# Patient Record
Sex: FEMALE | Race: WHITE | NOT HISPANIC OR LATINO | URBAN - METROPOLITAN AREA
[De-identification: names, ages, dates, MRNs, and addresses within clinical notes are randomized per-mention and may not be internally consistent; named-entity substitution may affect disease eponyms.]

---
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
## 2024-01-03 ENCOUNTER — TELEPHONE (OUTPATIENT)
Dept: OBGYN CLINIC | Facility: CLINIC | Age: 19
End: 2024-01-03

## 2024-01-03 NOTE — TELEPHONE ENCOUNTER
Received a call from referral specialist, Wilbur, stating the patient left a message on her line. Patient stated she is transferring care to a different office.

## 2024-01-03 NOTE — TELEPHONE ENCOUNTER
Vinnie, this is Edwin Chang. I will no longer be going to your practice. I have switched to another doctor. Just wanted to let you know. So you no longer kept calling about my visits. Thank you so much. Have a good day.